# Patient Record
Sex: MALE | URBAN - METROPOLITAN AREA
[De-identification: names, ages, dates, MRNs, and addresses within clinical notes are randomized per-mention and may not be internally consistent; named-entity substitution may affect disease eponyms.]

---

## 2021-10-19 ENCOUNTER — APPOINTMENT (OUTPATIENT)
Age: 36
Setting detail: DERMATOLOGY
End: 2021-10-19

## 2021-10-19 PROBLEM — D23.5 OTHER BENIGN NEOPLASM OF SKIN OF TRUNK: Status: ACTIVE | Noted: 2021-10-19

## 2021-10-19 PROCEDURE — ? COUNSELING

## 2021-10-19 PROCEDURE — 99202 OFFICE O/P NEW SF 15 MIN: CPT

## 2021-10-19 PROCEDURE — ? DIAGNOSIS COMMENT

## 2021-10-19 NOTE — PROCEDURE: DIAGNOSIS COMMENT
Comment: benign. pt notes lesion is stable. no additional tx is needed.\\n\\sohail regards to this lesion, pt should be safe to travel to [place] for an extended period of time with minimal medical care.
Detail Level: Generalized
Render Risk Assessment In Note?: no

## 2021-10-19 NOTE — HPI: SKIN LESION
What Type Of Note Output Would You Prefer (Optional)?: Standard Output
How Severe Is Your Skin Lesion?: moderate
Has Your Skin Lesion Been Treated?: not been treated
Is This A New Presentation, Or A Follow-Up?: Skin Lesion
Which Family Member (Optional)?: Grandmother skin related skin cancer on her scalp

## 2023-01-23 ENCOUNTER — NEW PATIENT (OUTPATIENT)
Dept: URBAN - METROPOLITAN AREA CLINIC 101 | Facility: CLINIC | Age: 38
End: 2023-01-23

## 2023-01-23 DIAGNOSIS — H35.412: ICD-10-CM

## 2023-01-23 DIAGNOSIS — H52.13: ICD-10-CM

## 2023-01-23 DIAGNOSIS — H43.823: ICD-10-CM

## 2023-01-23 DIAGNOSIS — H43.393: ICD-10-CM

## 2023-01-23 PROCEDURE — 92201 OPSCPY EXTND RTA DRAW UNI/BI: CPT

## 2023-01-23 PROCEDURE — 92134 CPTRZ OPH DX IMG PST SGM RTA: CPT

## 2023-01-23 PROCEDURE — 99204 OFFICE O/P NEW MOD 45 MIN: CPT

## 2023-01-23 ASSESSMENT — TONOMETRY
OS_IOP_MMHG: 16
OD_IOP_MMHG: 15

## 2023-01-23 ASSESSMENT — VISUAL ACUITY
OS_CC: 20/20-1
OD_CC: 20/20